# Patient Record
Sex: FEMALE | Race: OTHER | Employment: FULL TIME | URBAN - METROPOLITAN AREA
[De-identification: names, ages, dates, MRNs, and addresses within clinical notes are randomized per-mention and may not be internally consistent; named-entity substitution may affect disease eponyms.]

---

## 2019-10-23 ENCOUNTER — OFFICE VISIT (OUTPATIENT)
Dept: NEUROLOGY | Age: 39
End: 2019-10-23

## 2019-10-23 VITALS
WEIGHT: 127.2 LBS | BODY MASS INDEX: 23.41 KG/M2 | HEART RATE: 94 BPM | HEIGHT: 62 IN | RESPIRATION RATE: 14 BRPM | SYSTOLIC BLOOD PRESSURE: 104 MMHG | DIASTOLIC BLOOD PRESSURE: 74 MMHG | TEMPERATURE: 97.9 F | OXYGEN SATURATION: 99 %

## 2019-10-23 DIAGNOSIS — F07.81 POST CONCUSSION SYNDROME: Primary | ICD-10-CM

## 2019-10-23 DIAGNOSIS — M54.2 CERVICALGIA: ICD-10-CM

## 2019-10-23 DIAGNOSIS — G44.311 INTRACTABLE ACUTE POST-TRAUMATIC HEADACHE: ICD-10-CM

## 2019-10-23 DIAGNOSIS — G31.84 MCI (MILD COGNITIVE IMPAIRMENT): ICD-10-CM

## 2019-10-23 DIAGNOSIS — F41.1 GAD (GENERALIZED ANXIETY DISORDER): ICD-10-CM

## 2019-10-23 RX ORDER — NORTRIPTYLINE HYDROCHLORIDE 10 MG/1
10 CAPSULE ORAL
Qty: 30 CAP | Refills: 2 | Status: SHIPPED | OUTPATIENT
Start: 2019-10-23 | End: 2019-12-27 | Stop reason: DRUGHIGH

## 2019-10-23 RX ORDER — CYCLOBENZAPRINE HCL 10 MG
TABLET ORAL
Refills: 3 | COMMUNITY
Start: 2019-10-01

## 2019-10-23 RX ORDER — NAPROXEN 500 MG/1
TABLET ORAL
Refills: 3 | COMMUNITY
Start: 2019-10-01

## 2019-10-23 RX ORDER — PREDNISONE 10 MG/1
10 TABLET ORAL 2 TIMES DAILY
Qty: 30 TAB | Refills: 0 | Status: SHIPPED | OUTPATIENT
Start: 2019-10-23

## 2019-10-23 RX ORDER — DICLOFENAC SODIUM 100 MG/1
100 TABLET, FILM COATED, EXTENDED RELEASE ORAL DAILY
Qty: 30 TAB | Refills: 3 | Status: SHIPPED | OUTPATIENT
Start: 2019-10-23 | End: 2020-02-25 | Stop reason: SDUPTHER

## 2019-10-23 NOTE — PATIENT INSTRUCTIONS
All test results will be discussed at the next appointment. CT Scan of the Head: About This Test  What is it? A CT (computed tomography) scan uses X-rays to make detailed pictures of your body and the structures inside your body. A CT scan of the head can give your doctor information about your eyes, the bones of your face and nose, your inner ear, and your brain. During the test, you will lie on a table that is attached to the [employer]. The CT scanner is a large doughnut-shaped machine. Why is this test done? A CT scan of the head can help find the cause of symptoms that may mean you have a brain injury or bleeding inside your head. It can also find a tumor and damage caused by a stroke and help find the best treatment for the cause of a stroke. How can you prepare for the test?  Talk to your doctor about all your health conditions before the test. For example, tell your doctor if:  · You are or might be pregnant. · You are allergic to any medicines. · You have diabetes. · You take metformin. · You are breastfeeding. · You get nervous in confined spaces. You may need medicine to help you relax. What happens before the test?  · You may have to take off jewelry, glasses, or hearing aids. Wear comfortable, loose-fitting clothes. · You may have contrast material (dye) put into your arm through a tube called an IV. Contrast material helps doctors see specific organs, blood vessels, and most tumors. What happens during the test?  · You will lie on a table that is attached to the CT scanner. Straps will hold your head still but your face will not be covered. · The table will slide into the round opening of the scanner. The table will move during the scan. The scanner moves inside the doughnut-shaped casing around your body. · You will be asked to hold still during the scan. You may be asked to hold your breath for short periods.   · You may be alone in the scanning room, but a technologist will be watching you through a window and talking with you during the test.  What else should you know about the test?  · A CT scan does not hurt. · If a dye is used, you may feel a quick sting or pinch when the IV is started. The dye may make you feel warm and flushed and give you a metallic taste in your mouth. Some people feel sick to their stomach or get a headache. · If you breastfeed and are concerned about whether the dye used in this test is safe, talk to your doctor. Most experts believe that very little dye passes into breast milk and even less is passed on to the baby. But if you prefer, you can store some of your breast milk ahead of time and use it for a day or two after the test.  · There is a small chance of getting cancer from some types of CT scans. The risk is higher in children, young adults, and people who have many radiation tests. If you are concerned about this risk, talk to your doctor about the benefits and risks of a CT scan and confirm that the test is needed. How long does the test take? · The test will take about 30 to 60 minutes. Most of this time is spent getting ready for the scan. The actual test only takes a few minutes. What happens after the test?  · You will probably be able to go home right away. · You can go back to your usual activities right away. · Drink plenty of fluids for 24 hours after the test if dye was used, unless your doctor tells you not to. When should you call for help? Watch closely for changes in your health, and be sure to contact your doctor if you have any problems. Follow-up care is a key part of your treatment and safety. Be sure to make and go to all appointments, and call your doctor if you are having problems. It's also a good idea to keep a list of the medicines you take. Ask your doctor when you can expect to have your test results. Where can you learn more? Go to http://marquise-bowen.info/.   Enter L947 in the search box to learn more about \"CT Scan of the Head: About This Test.\"  Current as of: March 28, 2019  Content Version: 12.2  © 6205-3086 [employer]. Care instructions adapted under license by [employer] (which disclaims liability or warranty for this information). If you have questions about a medical condition or this instruction, always ask your healthcare professional. [address] any warranty or liability for your use of this information.

## 2019-10-26 ENCOUNTER — HOSPITAL ENCOUNTER (OUTPATIENT)
Dept: CT IMAGING | Age: 39
Discharge: HOME | End: 2019-10-26
Attending: PSYCHIATRY & NEUROLOGY
Payer: SELF-PAY

## 2019-10-26 DIAGNOSIS — F41.1 GAD (GENERALIZED ANXIETY DISORDER): ICD-10-CM

## 2019-10-26 DIAGNOSIS — F07.81 POST CONCUSSION SYNDROME: ICD-10-CM

## 2019-10-26 PROCEDURE — 70450 CT HEAD/BRAIN W/O DYE: CPT

## 2019-12-27 ENCOUNTER — OFFICE VISIT (OUTPATIENT)
Dept: NEUROLOGY | Age: 39
End: 2019-12-27

## 2019-12-27 VITALS
HEART RATE: 101 BPM | WEIGHT: 128 LBS | OXYGEN SATURATION: 98 % | DIASTOLIC BLOOD PRESSURE: 60 MMHG | BODY MASS INDEX: 23.41 KG/M2 | SYSTOLIC BLOOD PRESSURE: 116 MMHG

## 2019-12-27 DIAGNOSIS — M54.2 CERVICALGIA: ICD-10-CM

## 2019-12-27 DIAGNOSIS — F07.81 POSTCONCUSSION SYNDROME: Primary | ICD-10-CM
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
## 2019-12-27 DIAGNOSIS — R42 DIZZINESS: ICD-10-CM

## 2019-12-27 DIAGNOSIS — G31.84 MCI (MILD COGNITIVE IMPAIRMENT): ICD-10-CM

## 2019-12-27 DIAGNOSIS — G44.311 INTRACTABLE ACUTE POST-TRAUMATIC HEADACHE: ICD-10-CM

## 2019-12-27 RX ORDER — NORTRIPTYLINE HYDROCHLORIDE 25 MG/1
25 CAPSULE ORAL
Qty: 30 CAP | Refills: 1 | Status: SHIPPED | OUTPATIENT
Start: 2019-12-27 | End: 2020-02-25 | Stop reason: DRUGHIGH

## 2019-12-27 RX ORDER — IBUPROFEN 800 MG/1
800 TABLET ORAL
Qty: 30 TAB | Refills: 1 | Status: SHIPPED | OUTPATIENT
Start: 2019-12-27
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
## 2019-12-30 ENCOUNTER — HOSPITAL ENCOUNTER (OUTPATIENT)
Dept: NEUROLOGY | Age: 39
Discharge: HOME | End: 2019-12-30
Attending: PSYCHIATRY & NEUROLOGY
Payer: COMMERCIAL

## 2019-12-30 DIAGNOSIS — R42 DIZZINESS: ICD-10-CM

## 2019-12-30 DIAGNOSIS — F07.81 POSTCONCUSSION SYNDROME: ICD-10-CM

## 2019-12-30 PROCEDURE — 95816 EEG AWAKE AND DROWSY: CPT

## 2019-12-31 NOTE — PROCEDURES
[facility] [place] [facility]  EEG    Name:  Yoan Mckeon  MR#:  [id number]  :  1980  ACCOUNT #:  [de-identified]  DATE OF SERVICE:  2019      REQUESTING PROVIDER:  Magdaleno Baker MD    CLINICAL HISTORY:  An EEG is requested on this 14-year-old woman to evaluate for epileptiform abnormalities. MEDICATIONS:  Listed as Flexeril, Voltaren, Pamelor, Naprosyn. EEG REPORT:  This tracing is obtained during the awake state. During wakefulness, there are intermittent runs of posteriorly dominant and symmetrical low-to-medium amplitude 9-10 cycle per second activities which attenuate with eye opening. Lower voltage faster frequency activities are seen symmetrically over the anterior head regions. Hyperventilation was performed for 30 minutes with good effort and little alters the tracing. Intermittent photic stimulation little alters the tracing. Sleep is not attained. INTERPRETATION:  This EEG recorded during the awake state is normal.  No epileptiform abnormalities are seen.       Marena Cogan, MD      [id number]  D:  2019 7:15  T:  2019 12:50  JOB #:  [id number]

## 2020-01-23 ENCOUNTER — APPOINTMENT (OUTPATIENT)
Dept: PHYSICAL THERAPY | Age: 40
End: 2020-01-23

## 2020-02-07 ENCOUNTER — APPOINTMENT (OUTPATIENT)
Dept: PHYSICAL THERAPY | Age: 40
End: 2020-02-07

## 2020-02-10 ENCOUNTER — HOSPITAL ENCOUNTER (OUTPATIENT)
Dept: PHYSICAL THERAPY | Age: 40
Discharge: HOME | End: 2020-02-10
Payer: SELF-PAY

## 2020-02-10 PROCEDURE — 97162 PT EVAL MOD COMPLEX 30 MIN: CPT | Performed by: PHYSICAL THERAPIST

## 2020-02-10 PROCEDURE — 97112 NEUROMUSCULAR REEDUCATION: CPT | Performed by: PHYSICAL THERAPIST
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
## 2020-02-20 ENCOUNTER — HOSPITAL ENCOUNTER (OUTPATIENT)
Dept: PHYSICAL THERAPY | Age: 40
Discharge: HOME | End: 2020-02-20
Payer: SELF-PAY

## 2020-02-20 PROCEDURE — 97112 NEUROMUSCULAR REEDUCATION: CPT | Performed by: PHYSICAL MEDICINE & REHABILITATION

## 2020-02-20 PROCEDURE — 97110 THERAPEUTIC EXERCISES: CPT | Performed by: PHYSICAL MEDICINE & REHABILITATION

## 2020-02-20 NOTE — PROGRESS NOTES
PT DAILY TREATMENT NOTE - [facility] 2-15    Patient Name: Char Lincoln  Date:2020  : 1980  [x]  Patient  Verified  Payor: SELF PAY / Plan: [facility] SELF PAY / Product Type: Self Pay /    In time:900a  Out time:1000a  Total Treatment Time (min): 60  Total Timed Codes (min): 45  1:1 Treatment Time ( only): -   Visit #: 2      Treatment Area: Dizziness and giddiness [R42]    SUBJECTIVE  Pain Level (0-10 scale): \"slight HA\"  Any medication changes, allergies to medications, adverse drug reactions, diagnosis change, or new procedure performed?: [x] No    [] Yes (see summary sheet for update)  Subjective functional status/changes:   [] No changes reported  Patient reported that she starting doing her HEP a few days ago. Patient states that she continues to improve with driving but still has her instances of anxiety/panic. \"Last week was really hard. I had a 10/10 HA and was crying because of the pain. \"    OBJECTIVE    Modality rationale: decrease inflammation, decrease pain and increase tissue extensibility to improve the patients ability to ADLs, working tolerance   Min Type Additional Details    [] Estim: []Att   []Unatt        []TENS instruct                  []IFC  []Premod   []NMES                     []Other:  []w/US   []w/ice   []w/heat  Position:  Location:    []  Traction: [] Cervical       []Lumbar                       [] Prone          []Supine                       []Intermittent   []Continuous Lbs:  [] before manual  [] after manual  []w/heat    []  Ultrasound: []Continuous   [] Pulsed at:                           []1MHz   []3MHz Location:  W/cm2:    [] Paraffin         Location:   []w/heat   15 [x]  Ice     [x]  Heat  []  Ice massage Position: supine  Location: head and neck    []  Laser  []  Other: Position:  Location:      []  Vasopneumatic Device Pressure:       [] lo [] med [] hi   Temperature:      [x] Skin assessment post-treatment:  [x]intact []redness- no adverse reaction    []redness  adverse reaction:     15 min Therapeutic Exercise:  [x] See flow sheet :   Rationale: increase ROM, increase strength and improve coordination to improve the patients ability to ADLs, work tolerance     30 min Neuromuscular Re-education:  [x]  See flow sheet :   Rationale: improve coordination, improve balance and increase proprioception  to improve the patients ability to ADLs, work tolerance          With   [x] TE   [] TA   [] neuro   [] other: Patient Education: [x] Review HEP    [] Progressed/Changed HEP based on:   [] positioning   [] body mechanics   [] transfers   [] heat/ice application    [] other:      Other Objective/Functional Measures:   Great balance tolerance today. Slight increase in HA with 7 minutes on bike. Slight dizziness with vertical VORx1 and difficulty focusing eyes on target with horizontal VORx1     Pain Level (0-10 scale) post treatment: 0/10    ASSESSMENT/Changes in Function:     Patient will continue to benefit from skilled PT services to modify and progress therapeutic interventions, address functional mobility deficits, address ROM deficits, address strength deficits, analyze and address soft tissue restrictions, analyze and cue movement patterns, analyze and modify body mechanics/ergonomics, assess and modify postural abnormalities and address imbalance/dizziness to attain remaining goals. []  See Plan of Care  []  See progress note/recertification  []  See Discharge Summary         Progress towards goals / Updated goals:  Patient tolerated today's session very well and should continue to progress.      PLAN  [x]  Upgrade activities as tolerated     [x]  Continue plan of care  [x]  Update interventions per flow sheet       []  Discharge due to:_  []  Other:_      Teodora Speedy PTA, OPTA, CPT  2/20/2020

## 2020-02-24 ENCOUNTER — HOSPITAL ENCOUNTER (OUTPATIENT)
Dept: PHYSICAL THERAPY | Age: 40
Discharge: HOME | End: 2020-02-24
Payer: SELF-PAY

## 2020-02-24 PROCEDURE — 97112 NEUROMUSCULAR REEDUCATION: CPT | Performed by: PHYSICAL MEDICINE & REHABILITATION

## 2020-02-24 PROCEDURE — 97110 THERAPEUTIC EXERCISES: CPT | Performed by: PHYSICAL MEDICINE & REHABILITATION

## 2020-02-24 NOTE — PROGRESS NOTES
PT DAILY TREATMENT NOTE - [facility] -15    Patient Name: Edilson Rivers  Date:2020  : 1980  [x]  Patient  Verified  Payor: SELF PAY [facility] / Product Type: Self Pay /    In time:1110 am  Out time:1210p  Total Treatment Time (min): 60  Total Timed Codes (min): 45  1:1 Treatment Time ( only): -   Visit #: 3      Treatment Area: Dizziness and giddiness [R42]    SUBJECTIVE  Pain Level (0-10 scale): 7/10  Any medication changes, allergies to medications, adverse drug reactions, diagnosis change, or new procedure performed?: [x] No    [] Yes (see summary sheet for update)  Subjective functional status/changes:   [] No changes reported  Patient reported that she has been doing her HEP 1 x a day right now and some are still challenging. Patient stated her HA have been better since last visit.     OBJECTIVE    Modality rationale: decrease inflammation, decrease pain and increase tissue extensibility to improve the patients ability to ADLs, working tolerance   Min Type Additional Details    [] Estim: []Att   []Unatt        []TENS instruct                  []IFC  []Premod   []NMES                     []Other:  []w/US   []w/ice   []w/heat  Position:  Location:    []  Traction: [] Cervical       []Lumbar                       [] Prone          []Supine                       []Intermittent   []Continuous Lbs:  [] before manual  [] after manual  []w/heat    []  Ultrasound: []Continuous   [] Pulsed at:                           []1MHz   []3MHz Location:  W/cm2:    [] Paraffin         Location:   []w/heat   15 []  Ice     [x]  Heat  []  Ice massage Position: supine  Location: head and neck    []  Laser  []  Other: Position:  Location:      []  Vasopneumatic Device Pressure:       [] lo [] med [] hi   Temperature:      [x] Skin assessment post-treatment:  [x]intact []redness- no adverse reaction    []redness  adverse reaction:     15 min Therapeutic Exercise:  [x] See flow sheet :   Rationale: increase ROM, increase strength and improve coordination to improve the patients ability to ADLs, work tolerance     30 min Neuromuscular Re-education:  [x]  See flow sheet :   Rationale: improve coordination, improve balance and increase proprioception  to improve the patients ability to ADLs, work tolerance          With   [x] TE   [] TA   [] neuro   [] other: Patient Education: [x] Review HEP    [] Progressed/Changed HEP based on:   [] positioning   [] body mechanics   [] transfers   [] heat/ice application    [] other:      Other Objective/Functional Measures:   Significant improvement in EC balance today. Slight increase in HA with 8 minutes on bike. Slight increase in HA with vertical VORx1 and able to focus on tartget with increase in HA/dizziness with horizontal VORx1   All symptoms reduced within 1 minute. Pain Level (0-10 scale) post treatment: 0/10    ASSESSMENT/Changes in Function:     Patient will continue to benefit from skilled PT services to modify and progress therapeutic interventions, address functional mobility deficits, address ROM deficits, address strength deficits, analyze and address soft tissue restrictions, analyze and cue movement patterns, analyze and modify body mechanics/ergonomics, assess and modify postural abnormalities and address imbalance/dizziness to attain remaining goals. []  See Plan of Care  []  See progress note/recertification  []  See Discharge Summary         Progress towards goals / Updated goals:  Patient tolerated today's session very well and should continue to progress.      PLAN  [x]  Upgrade activities as tolerated     [x]  Continue plan of care  [x]  Update interventions per flow sheet       []  Discharge due to:_  []  Other:_      Amalia Sorenson PTA, OPTA, CPT  2/24/2020

## 2020-02-25 ENCOUNTER — OFFICE VISIT (OUTPATIENT)
Dept: NEUROLOGY | Age: 40
End: 2020-02-25

## 2020-02-25 VITALS
WEIGHT: 130.4 LBS | BODY MASS INDEX: 24 KG/M2 | OXYGEN SATURATION: 98 % | DIASTOLIC BLOOD PRESSURE: 70 MMHG | SYSTOLIC BLOOD PRESSURE: 98 MMHG | RESPIRATION RATE: 18 BRPM | TEMPERATURE: 98.5 F | HEART RATE: 77 BPM | HEIGHT: 62 IN

## 2020-02-25 DIAGNOSIS — F07.81 POSTCONCUSSION SYNDROME: Primary | ICD-10-CM

## 2020-02-25 DIAGNOSIS — R42 DIZZINESS: ICD-10-CM
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
## 2020-02-25 DIAGNOSIS — G31.84 MCI (MILD COGNITIVE IMPAIRMENT): ICD-10-CM

## 2020-02-25 DIAGNOSIS — G44.311 INTRACTABLE ACUTE POST-TRAUMATIC HEADACHE: ICD-10-CM

## 2020-02-25 DIAGNOSIS — M54.2 CERVICALGIA: ICD-10-CM

## 2020-02-25 DIAGNOSIS — F41.1 GAD (GENERALIZED ANXIETY DISORDER): ICD-10-CM

## 2020-02-25 DIAGNOSIS — M79.18 MYOFASCIAL MUSCLE PAIN: ICD-10-CM

## 2020-02-25 RX ORDER — DICLOFENAC SODIUM 100 MG/1
100 TABLET, FILM COATED, EXTENDED RELEASE ORAL DAILY
Qty: 30 TAB | Refills: 3 | Status: SHIPPED | OUTPATIENT
Start: 2020-02-25

## 2020-02-25 RX ORDER — NORTRIPTYLINE HYDROCHLORIDE 10 MG/1
10 CAPSULE ORAL
Qty: 30 CAP | Refills: 2 | Status: SHIPPED | OUTPATIENT
Start: 2020-02-25
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
## 2020-03-03 ENCOUNTER — HOSPITAL ENCOUNTER (OUTPATIENT)
Dept: PHYSICAL THERAPY | Age: 40
Discharge: HOME | End: 2020-03-03
Payer: SELF-PAY

## 2020-03-03 PROCEDURE — 97112 NEUROMUSCULAR REEDUCATION: CPT | Performed by: PHYSICAL THERAPIST

## 2020-03-03 PROCEDURE — 97110 THERAPEUTIC EXERCISES: CPT | Performed by: PHYSICAL THERAPIST

## 2020-03-03 NOTE — PROGRESS NOTES
PT DAILY TREATMENT NOTE - [facility] 2-15    Patient Name: Rogelio Hence  Date:3/3/2020  : 1980  [x]  Patient  Verified  Payor: SELF PAY / Plan: [facility] SELF PAY / Product Type: Self Pay /    In time:11:50 am  Out time: 12:50 PM  Total Treatment Time (min): 60    Visit #: 4      Treatment Area: Dizziness and giddiness [R42]    SUBJECTIVE  Pain Level (0-10 scale): 5/10  Any medication changes, allergies to medications, adverse drug reactions, diagnosis change, or new procedure performed?: [x] No    [] Yes (see summary sheet for update)  Subjective functional status/changes:   [] No changes reported  Patient reports she feels like she has progressed in a lot of areas but she still has headaches every day. Pt reports she is trying to sleep at night. Pt saw her MD last week.   EEG negative  Pt started taking Diclofenac   OBJECTIVE    Modality rationale: decrease inflammation, decrease pain and increase tissue extensibility to improve the patients ability to ADLs, working tolerance   Min Type Additional Details    [] Estim: []Att   []Unatt        []TENS instruct                  []IFC  []Premod   []NMES                     []Other:  []w/US   []w/ice   []w/heat  Position:  Location:    []  Traction: [] Cervical       []Lumbar                       [] Prone          []Supine                       []Intermittent   []Continuous Lbs:  [] before manual  [] after manual  []w/heat    []  Ultrasound: []Continuous   [] Pulsed at:                           []1MHz   []3MHz Location:  W/cm2:    [] Paraffin         Location:   []w/heat   10 []  Ice     [x]  Heat  []  Ice massage Position: supine  Location: head and neck    []  Laser  []  Other: Position:  Location:      []  Vasopneumatic Device Pressure:       [] lo [] med [] hi   Temperature:      [x] Skin assessment post-treatment:  [x]intact []redness- no adverse reaction    []redness  adverse reaction:     10 min Therapeutic Exercise:  [x] See flow sheet :   Rationale: increase ROM, increase strength and improve coordination to improve the patients ability to ADLs, work tolerance     35 min Neuromuscular Re-education:  [x]  See flow sheet :   Rationale: improve coordination, improve balance and increase proprioception  to improve the patients ability to ADLs, work tolerance          With   [x] TE   [] TA   [] neuro   [] other: Patient Education: [x] Review HEP    [] Progressed/Changed HEP based on:   [] positioning   [] body mechanics   [] transfers   [] heat/ice application    [] other:      Other Objective/Functional Measures:   VOR x1 for one minute in sitting, recovery in 1 minute    SLS R 27 L 25    Pain Level (0-10 scale) post treatment: 0/10    ASSESSMENT/Changes in Function:     Patient will continue to benefit from skilled PT services to modify and progress therapeutic interventions, address functional mobility deficits, address ROM deficits, address strength deficits, analyze and address soft tissue restrictions, analyze and cue movement patterns, analyze and modify body mechanics/ergonomics, assess and modify postural abnormalities and address imbalance/dizziness to attain remaining goals.      []  See Plan of Care  []  See progress note/recertification  []  See Discharge Summary         Progress towards goals / Updated goals:  Advanced vest exercises, reviewed importance of eye protection in sunlight and minimizing screen time    PLAN  [x]  Upgrade activities as tolerated     [x]  Continue plan of care  [x]  Update interventions per flow sheet       []  Discharge due to:_  []  Other:_      Timbo Echeverria, PT  DPT, [facility] 3/3/2020

## 2020-03-31 ENCOUNTER — HOSPITAL ENCOUNTER (OUTPATIENT)
Dept: PHYSICAL THERAPY | Age: 40
Discharge: HOME | End: 2020-03-31
Payer: SELF-PAY

## 2020-03-31 PROCEDURE — 97112 NEUROMUSCULAR REEDUCATION: CPT | Performed by: PHYSICAL THERAPIST

## 2020-03-31 PROCEDURE — 97110 THERAPEUTIC EXERCISES: CPT | Performed by: PHYSICAL THERAPIST

## 2020-03-31 NOTE — PROGRESS NOTES
[facility]  [address] [facility],  [address]  Phone: [phone]      Fax:  [phone]    Progress Note    Name: Yoel Solitario   : 1980   MD: Mitzy Steiner MD       Treatment Diagnosis: Dizziness and giddiness [R42]  Start of Care: 2/10/20    Visits from Start of Care: 5  Missed Visits: 0    Summary of Care: Therapeutic Exercise,  Manual Therapy, Neuromuscular Re-education,Patient Education, Home Exercise Program         Assessment / Recommendations: The patient has completed 5 visits and has made significant gains in static balance and demonstrates improved activity tolerance. The patient reports improved driving tolerance and performance of work tasks. Her symptoms are less frequent and less intense. The patient continues to be limited by intermittent neck pain, headaches, dizziness and nausea, affecting her ability to perform prolonged tasks. The patient has met all short term goals and would benefit from continued PT to reach long term goals. Short Term Goals: To be accomplished in 4 weeks:  1) The patient will be independent with introductory HEP - MET  2) The patient will improve cervical rotation to 50 deg B to improve ease with driving - Not Measured  3) The patient will demonstrate negative Romberg to indicate improved static stance - MET  Long Term Goals: To be accomplished in 12 weeks:  1) The patient will report ability to complete household chores without an increase in symptoms - NOT MET  2) The patient will report ability to complete work tasks without an increase in symptoms- NOT MET  3) The patient will report ability to drive for one hour without an increase in symptoms -  NOT MET  Fabiana Cherry, PT 3/31/2020    ________________________________________________________________________  NOTE TO PHYSICIAN:  Please complete the following and fax to:   [facility]: [phone]  . Retain this original for your records. If you are unable to process this request in 24 hours, please contact our office.        ____ I have read the above report and request that my patient continue therapy with the following changes/special instructions:  ____ I have read the above report and request that my patient be discharged from therapy    Physician's Signature:_________________ Date:___________Time:__________

## 2020-03-31 NOTE — PROGRESS NOTES
PT DAILY TREATMENT NOTE - [facility] 2-15    Patient Name: Minda Richter  Date:3/31/2020  : 1980  [x]  Patient  Verified  Payor: SELF PAY / Plan: [facility] SELF PAY / Product Type: Self Pay /    In time:10:50 AM  Out time: 11:55 AM  Total Treatment Time (min): 65  Visit #: 5    Treatment Area: Dizziness and giddiness [R42]    SUBJECTIVE  Pain Level (0-10 scale): 5/10  Any medication changes, allergies to medications, adverse drug reactions, diagnosis change, or new procedure performed?: [x] No    [] Yes (see summary sheet for update)  Subjective functional status/changes:   [] No changes reported  Patient reports she has been feeling better overall. Pt is still taking the Diclofenac. Pt reports she still has headaches but they aren't as bad. Pt had an episode of intense concussion symptoms when she approached a bunch of police cars on the highway at night. \"Still my sleep is not good. \"  Pt was sick last week and couldn't come to PT last week. Pt reports her panic attacks are better when she is driving so she is no longer seeing a counselor.     OBJECTIVE    Modality rationale: decrease inflammation, decrease pain and increase tissue extensibility to improve the patients ability to ADLs, working tolerance   Min Type Additional Details    [] Estim: []Att   []Unatt        []TENS instruct                  []IFC  []Premod   []NMES                     []Other:  []w/US   []w/ice   []w/heat  Position:  Location:    []  Traction: [] Cervical       []Lumbar                       [] Prone          []Supine                       []Intermittent   []Continuous Lbs:  [] before manual  [] after manual  []w/heat    []  Ultrasound: []Continuous   [] Pulsed at:                           []1MHz   []3MHz Location:  W/cm2:    [] Paraffin         Location:   []w/heat   5 []  Ice     [x]  Heat  []  Ice massage Position: supine  Location: head and neck    []  Laser  []  Other: Position:  Location:      []  Vasopneumatic Device Pressure:       [] lo [] med [] hi   Temperature:      [x] Skin assessment post-treatment:  [x]intact []redness- no adverse reaction    []redness  adverse reaction:     10 min Therapeutic Exercise:  [x] See flow sheet :   Rationale: increase ROM, increase strength and improve coordination to improve the patients ability to ADLs, work tolerance     50 min Neuromuscular Re-education:  [x]  See flow sheet :   Rationale: improve coordination, improve balance and increase proprioception  to improve the patients ability to ADLs, work tolerance          With   [x] TE   [] TA   [] neuro   [] other: Patient Education: [x] Review HEP    [] Progressed/Changed HEP based on:   [] positioning   [] body mechanics   [] transfers   [] heat/ice application    [] other:      Other Objective/Functional Measures:   VOR x1 for one minute in sitting, recovery in 1 minute    SLS 30 s B  EC balance Heel to bunion 30 s No LOB    Encouraged to purchase blue light blockers    Pain Level (0-10 scale) post treatment: 0/10    ASSESSMENT/Changes in Function:     Patient will continue to benefit from skilled PT services to modify and progress therapeutic interventions, address functional mobility deficits, address ROM deficits, address strength deficits, analyze and address soft tissue restrictions, analyze and cue movement patterns, analyze and modify body mechanics/ergonomics, assess and modify postural abnormalities and address imbalance/dizziness to attain remaining goals. []  See Plan of Care  []  See progress note/recertification  []  See Discharge Summary         Progress towards goals / Updated goals:  Pt given updated HEP as she will be unable to return due to COVID-19.     PLAN  [x]  Upgrade activities as tolerated     [x]  Continue plan of care  [x]  Update interventions per flow sheet       []  Discharge due to:_  []  Other:_      Shahid Pompa, PT  DPT, [facility] 3/31/2020

## 2020-04-15 ENCOUNTER — HOSPITAL ENCOUNTER (OUTPATIENT)
Dept: PHYSICAL THERAPY | Age: 40
Discharge: HOME | End: 2020-04-15
Payer: SELF-PAY

## 2020-04-15 PROCEDURE — 97110 THERAPEUTIC EXERCISES: CPT | Performed by: PHYSICAL THERAPIST

## 2020-04-15 PROCEDURE — 97112 NEUROMUSCULAR REEDUCATION: CPT | Performed by: PHYSICAL THERAPIST

## 2020-04-15 NOTE — PROGRESS NOTES
PT DAILY TREATMENT NOTE 2-15    Patient Name: Lee Nuñez  Date:4/15/2020  : 1980  [x]  Patient  Verified  Payor: SELF PAY / Plan: [facility] SELF PAY / Product Type: Self Pay /     In time:7:05 AM  Out time:7:45 AM  Total Treatment Time (min): 40  Visit #:  6    Treatment Area: Dizziness and giddiness [R42]    Lee Nuñez was informed of the inherent limitations of a virtual visit,  and has consented to a virtual therapy visit on 4/15/2020. Information regarding emergency contact information for this patient during this visit is to contact:  Darylene Mins at [phone] in addition to calling 911. The patient was informed that at any time during the virtual visit, they can decide to stop the virtual visit. The patient verified that they are physically located in the [facility] for this virtual visit. SUBJECTIVE  Pain Level (0-10 scale): 4/10  Any medication changes, allergies to medications, adverse drug reactions, diagnosis change, or new procedure performed?: [x] No    [] Yes (see summary sheet for update)  Subjective functional status/changes:   [] No changes reported  \"I'm ok, my headaches are not that bad. \"  Pt is still working and still has to go out.    \"sometimes I get flashbacks when I'm driving, especially when I have to somewhere new\"    OBJECTIVE    10 min Therapeutic Exercise:  [x] See flow sheet :   Rationale: increase ROM, increase strength and improve coordination to improve the patients ability to sit, stand, transfer, ambulate, lift, carry, reach, complete ADLs    30 min Neuromuscular Re-education:  [x]  See flow sheet :   Rationale: improve coordination, improve balance and increase proprioception  to improve the patients ability to sit, stand, transfer, ambulate, lift, carry, reach, complete ADLs       With   [x] TE   [] TA   [x] neuro   [] other: Patient Education: [x] Review HEP    [] Progressed/Changed HEP based on:   [x] positioning   [x] body mechanics   [] transfers [x] heat/ice application    [] other:      Other Objective/Functional Measures:     Heel to GT EC 30 second hold no LOB      Dizziness with standing VOR x 1 resolved within 2 min sitting rest break    Access Code: [email or web address]   URL: [email or web address]   Date: 04/15/2020   Prepared by: Gabriel Fuentes     Exercises   Single Arm Doorway Pec Stretch at 120 Degrees Abduction - 2 sets - 30 hold - 2x daily - 7x weekly  \"this one helps with my nausea\"    Pain Level (0-10 scale) post treatment: 0    ASSESSMENT/Changes in Function:     Patient will continue to benefit from skilled PT services to modify and progress therapeutic interventions, address functional mobility deficits, address ROM deficits, address strength deficits, analyze and address soft tissue restrictions, analyze and cue movement patterns, analyze and modify body mechanics/ergonomics, assess and modify postural abnormalities, address imbalance/dizziness and instruct in home and community integration to attain remaining goals. []  See Plan of Care  []  See progress note/recertification  []  See Discharge Summary         Progress towards goals / Updated goals:   Balance EC improved. Reviewed importance of compliance with HEP. PLAN  [x]  Upgrade activities as tolerated     [x]  Continue plan of care  [x]  Update interventions per flow sheet       []  Discharge due to:_  []  Other:_        James Godinez is a 38 y.o. female being evaluated by a Virtual Visit (video visit) encounter to address concerns as mentioned above. A caregiver was present when appropriate. Due to this being a TeleHealth encounter (During [place] [address] public health emergency), evaluation of the following areas was limited: Direct tissue palpation, direct goniometric measurements, blood pressure, O2 saturation.  Pursuant to the emergency declaration under the [facility] waiver authority and the [place] [employer] and [employer] Supplemental Appropriations Act, this Virtual Visit was conducted with patient's (and/or legal guardian's) consent, to reduce the risk of exposure to COVID-19 and provide necessary medical care. Services were provided through a video synchronous discussion virtually to substitute for in-person encounter. --Vee Martinez, PT on 4/15/2020 at 7:00 AM    An electronic signature was used to authenticate this note.

## 2020-04-22 ENCOUNTER — HOSPITAL ENCOUNTER (OUTPATIENT)
Dept: PHYSICAL THERAPY | Age: 40
Discharge: HOME | End: 2020-04-22
Payer: SELF-PAY

## 2020-04-22 PROCEDURE — 97112 NEUROMUSCULAR REEDUCATION: CPT | Performed by: PHYSICAL THERAPIST

## 2020-04-22 PROCEDURE — 97110 THERAPEUTIC EXERCISES: CPT | Performed by: PHYSICAL THERAPIST

## 2020-04-22 NOTE — PROGRESS NOTES
PT DAILY TREATMENT NOTE 2-15    Patient Name: Afsaneh Hubbard  Date:2020  : 1980  [x]  Patient  Verified  Payor: SELF PAY / Plan: [facility] SELF PAY / Product Type: Self Pay /     In time:7:05 AM  Out time:7:45 AM  Total Treatment Time (min): 40  Visit #:  7    Treatment Area: Dizziness and giddiness [R42]    Afsaneh Hubbard was informed of the inherent limitations of a virtual visit,  and has consented to a virtual therapy visit on 2020. Information regarding emergency contact information for this patient during this visit is to contact:  Fidel Valle at [phone] in addition to calling 911. The patient was informed that at any time during the virtual visit, they can decide to stop the virtual visit. The patient verified that they are physically located in the [facility] for this virtual visit.     SUBJECTIVE  Pain Level (0-10 scale): 4/10  Any medication changes, allergies to medications, adverse drug reactions, diagnosis change, or new procedure performed?: [x] No    [] Yes (see summary sheet for update)  Subjective functional status/changes:   [] No changes reported  Pt reports she is working a lot and is very tired   Pt reports compliance with HEP \"my exercises are helping me\" \"Overall I'm less stressed and irritable\"  \"There were days that I was confused\"  \"its worse when its raining\" \"its worse when I'm driving\"    OBJECTIVE    10 min Therapeutic Exercise:  [x] See flow sheet :   Rationale: increase ROM, increase strength and improve coordination to improve the patients ability to sit, stand, transfer, ambulate, lift, carry, reach, complete ADLs    30 min Neuromuscular Re-education:  [x]  See flow sheet :   Rationale: improve coordination, improve balance and increase proprioception  to improve the patients ability to sit, stand, transfer, ambulate, lift, carry, reach, complete ADLs       With   [x] TE   [] TA   [x] neuro   [] other: Patient Education: [x] Review HEP    [] Progressed/Changed HEP based on:   [x] positioning   [x] body mechanics   [] transfers   [x] heat/ice application    [] other:      Other Objective/Functional Measures:   SLS EO 30 s  B  Heel to GT EC 30 second hold no LOB  HTT 10 s EC    HA with standing VOR x 1 facing pattern resolved within 2 min sitting rest break    Pain Level (0-10 scale) post treatment: 0    ASSESSMENT/Changes in Function:     Patient will continue to benefit from skilled PT services to modify and progress therapeutic interventions, address functional mobility deficits, address ROM deficits, address strength deficits, analyze and address soft tissue restrictions, analyze and cue movement patterns, analyze and modify body mechanics/ergonomics, assess and modify postural abnormalities, address imbalance/dizziness and instruct in home and community integration to attain remaining goals. []  See Plan of Care  []  See progress note/recertification  []  See Discharge Summary         Progress towards goals / Updated goals:   Advanced VOR x 1    PLAN  [x]  Upgrade activities as tolerated     [x]  Continue plan of care  [x]  Update interventions per flow sheet       []  Discharge due to:_  []  Other:_        Marciano Cardoso is a 38 y.o. female being evaluated by a Virtual Visit (video visit) encounter to address concerns as mentioned above. A caregiver was present when appropriate. Due to this being a TeleHealth encounter (During [facility] public health emergency), evaluation of the following areas was limited: Direct tissue palpation, direct goniometric measurements, blood pressure, O2 saturation. Pursuant to the emergency declaration under the [address] [place] [address], [address] [place] [address] and the [employer] General Act, this Virtual Visit was conducted with patient's (and/or legal guardian's) consent, to reduce the risk of exposure to COVID-19 and provide necessary medical care. Services were provided through a video synchronous discussion virtually to substitute for in-person encounter. --Ariana Davis PT on 4/22/2020 at 7:00 AM    An electronic signature was used to authenticate this note.

## 2020-04-29 ENCOUNTER — HOSPITAL ENCOUNTER (OUTPATIENT)
Dept: PHYSICAL THERAPY | Age: 40
Discharge: HOME | End: 2020-04-29
Payer: SELF-PAY

## 2020-04-29 PROCEDURE — 97112 NEUROMUSCULAR REEDUCATION: CPT | Performed by: PHYSICAL THERAPIST

## 2020-04-29 PROCEDURE — 97110 THERAPEUTIC EXERCISES: CPT | Performed by: PHYSICAL THERAPIST

## 2020-04-29 NOTE — PROGRESS NOTES
PT DAILY TREATMENT NOTE 2-15    Patient Name: Higinio Escobar  Date:2020  : 1980  [x]  Patient  Verified  Payor: SELF PAY / Plan: [facility] SELF PAY / Product Type: Self Pay /     In time:7:10 AM  Out time:7:55 AM  Total Treatment Time (min): 45  Visit #:  8    Treatment Area: Dizziness and giddiness [R42]    Higinio Escobar was informed of the inherent limitations of a virtual visit,  and has consented to a virtual therapy visit on 2020. Information regarding emergency contact information for this patient during this visit is to contact:  Romulo Cuevas at [phone] in addition to calling 911. The patient was informed that at any time during the virtual visit, they can decide to stop the virtual visit. The patient verified that they are physically located in the [facility] for this virtual visit.     SUBJECTIVE  Pain Level (0-10 scale): 4/10  Any medication changes, allergies to medications, adverse drug reactions, diagnosis change, or new procedure performed?: [x] No    [] Yes (see summary sheet for update)  Subjective functional status/changes:   [] No changes reported  Pt reports her balance is getting better \"Sometimes my headaches are really bad\" \"Saturday I had a sharp pain and it was across her R forehead\" \"It lasted over an hour\" \"I had to take medication\"      OBJECTIVE    15 min Therapeutic Exercise:  [x] See flow sheet :   Rationale: increase ROM, increase strength and improve coordination to improve the patients ability to sit, stand, transfer, ambulate, lift, carry, reach, complete ADLs    30 min Neuromuscular Re-education:  [x]  See flow sheet :   Rationale: improve coordination, improve balance and increase proprioception  to improve the patients ability to sit, stand, transfer, ambulate, lift, carry, reach, complete ADLs       With   [x] TE   [] TA   [x] neuro   [] other: Patient Education: [x] Review HEP    [] Progressed/Changed HEP based on:   [x] positioning   [x] body mechanics   [] transfers   [x] heat/ice application    [] other:      Other Objective/Functional Measures:   SLS on pillow EO 30 s  B  HTT EC 30 s B    Excellent tolerance of VOR x2 this visit in standing, blank wall, 45 s    Pain Level (0-10 scale) post treatment: 0    ASSESSMENT/Changes in Function:     Patient will continue to benefit from skilled PT services to modify and progress therapeutic interventions, address functional mobility deficits, address ROM deficits, address strength deficits, analyze and address soft tissue restrictions, analyze and cue movement patterns, analyze and modify body mechanics/ergonomics, assess and modify postural abnormalities, address imbalance/dizziness and instruct in home and community integration to attain remaining goals. []  See Plan of Care  []  See progress note/recertification  []  See Discharge Summary         Progress towards goals / Updated goals:   Advanced Balance and vestibular HEP    PLAN  [x]  Upgrade activities as tolerated     [x]  Continue plan of care  [x]  Update interventions per flow sheet       []  Discharge due to:_  []  Other:_        Dyan Martell is a 38 y.o. female being evaluated by a Virtual Visit (video visit) encounter to address concerns as mentioned above. A caregiver was present when appropriate. Due to this being a TeleHealth encounter (During [facility] public health emergency), evaluation of the following areas was limited: Direct tissue palpation, direct goniometric measurements, blood pressure, O2 saturation. Pursuant to the emergency declaration under the [facility], [address] [place] [address] authority and the [employer] General Act, this Virtual Visit was conducted with patient's (and/or legal guardian's) consent, to reduce the risk of exposure to COVID-19 and provide necessary medical care.       Services were provided through a video synchronous discussion virtually to substitute for in-person encounter. --Ariana Davis, PT on 4/29/2020 at 7:00 AM    An electronic signature was used to authenticate this note.

## 2020-05-06 ENCOUNTER — HOSPITAL ENCOUNTER (OUTPATIENT)
Dept: PHYSICAL THERAPY | Age: 40
Discharge: HOME | End: 2020-05-06
Payer: COMMERCIAL

## 2020-05-06 PROCEDURE — 97110 THERAPEUTIC EXERCISES: CPT | Performed by: PHYSICAL THERAPIST

## 2020-05-06 PROCEDURE — 97112 NEUROMUSCULAR REEDUCATION: CPT | Performed by: PHYSICAL THERAPIST

## 2020-05-06 NOTE — PROGRESS NOTES
PT DAILY TREATMENT NOTE 2-15    Patient Name: Amalia Lim  Date:2020  : 1980  [x]  Patient  Verified  Payor: SELF PAY / Plan: [employer] SELF PAY / Product Type: Self Pay /     In time:7:05 AM  Out time:8:00 AM  Total Treatment Time (min): 55  Visit #:  9    Treatment Area: Dizziness and giddiness [R42]    Amalia Lim was informed of the inherent limitations of a virtual visit,  and has consented to a virtual therapy visit on 2020. Information regarding emergency contact information for this patient during this visit is to contact:  Linwood Peace at [phone] in addition to calling 911. The patient was informed that at any time during the virtual visit, they can decide to stop the virtual visit. The patient verified that they are physically located in the [facility] for this virtual visit.     SUBJECTIVE  Pain Level (0-10 scale): 4/10  Any medication changes, allergies to medications, adverse drug reactions, diagnosis change, or new procedure performed?: [x] No    [] Yes (see summary sheet for update)  Subjective functional status/changes:   [] No changes reported  Pt reports she has been able to walk 30 minutes every day  Pt continues to have a sensation that she is spinning intermittently  Pt has been having some right sided neck and arm pain    OBJECTIVE    25 min Therapeutic Exercise:  [x] See flow sheet :   Rationale: increase ROM, increase strength and improve coordination to improve the patients ability to sit, stand, transfer, ambulate, lift, carry, reach, complete ADLs    30 min Neuromuscular Re-education:  [x]  See flow sheet :   Rationale: improve coordination, improve balance and increase proprioception  to improve the patients ability to sit, stand, transfer, ambulate, lift, carry, reach, complete ADLs       With   [x] TE   [] TA   [x] neuro   [] other: Patient Education: [x] Review HEP    [] Progressed/Changed HEP based on:   [x] positioning   [x] body mechanics   [] transfers   [x] heat/ice application    [] other:      Other Objective/Functional Measures:   SLS on pillow EO 30 s  B  HTT EC 30 s B     Cervical spine rotation R 75% L 100%    Excellent tolerance of VOR x2 this visit in standing, blank wall, 60 s    Pain Level (0-10 scale) post treatment: 0    ASSESSMENT/Changes in Function:     Patient will continue to benefit from skilled PT services to modify and progress therapeutic interventions, address functional mobility deficits, address ROM deficits, address strength deficits, analyze and address soft tissue restrictions, analyze and cue movement patterns, analyze and modify body mechanics/ergonomics, assess and modify postural abnormalities, address imbalance/dizziness and instruct in home and community integration to attain remaining goals. []  See Plan of Care  [x]  See progress note/recertification  []  See Discharge Summary         Progress towards goals / Updated goals:   See prog note    PLAN  [x]  Upgrade activities as tolerated     [x]  Continue plan of care  [x]  Update interventions per flow sheet       []  Discharge due to:_  []  Other:_        Dyan Martell is a 38 y.o. female being evaluated by a Virtual Visit (video visit) encounter to address concerns as mentioned above. A caregiver was present when appropriate. Due to this being a TeleHealth encounter (During [facility] public health emergency), evaluation of the following areas was limited: Direct tissue palpation, direct goniometric measurements, blood pressure, O2 saturation. Pursuant to the emergency declaration under the [address] [place] [address], [address] [place] [address] authority and the [employer] and [employer] General Act, this Virtual Visit was conducted with patient's (and/or legal guardian's) consent, to reduce the risk of exposure to COVID-19 and provide necessary medical care.       Services were provided through a video synchronous discussion virtually to substitute for in-person encounter. --Olimpia Vargas, PT on 5/6/2020 at 7:00 AM    An electronic signature was used to authenticate this note.

## 2020-05-06 NOTE — PROGRESS NOTES
[facility]  [place]  [facility] Vanessa Jacob  Phone: [phone]      Fax:  [phone]    Progress Note and Updated POC    Name: Arash Hensley   : 1980   MD: Franklin Stratton MD       Treatment Diagnosis: Dizziness and giddiness [R42]  Start of Care: 2/10/20    Visits from Start of Care: 9  Missed Visits: 0    Summary of Care: Therapeutic Exercise,  Manual Therapy, Neuromuscular Re-education,Patient Education, Home Exercise Program         Assessment / Recommendations: The patient has completed 9 visits and has made significant gains in static balance and demonstrates improved activity tolerance. The patient reports improved driving tolerance and performance of work tasks. The patient has initiated a walking program 30 minutes per day without an increase in symptoms. Overall, her symptoms are less frequent and less intense. The patient continues to be limited by intermittent neck pain, headaches, dizziness and nausea, affecting her ability to perform prolonged tasks. She reports experiencing these symptoms 2-3 times per week. She continues to require medication to sleep through the night and reports continued difficulty with her memory. The patient has met all short term goals and would benefit from continued PT 1x every other week to reach updated long term goals below. Short Term Goals: To be accomplished in 4 weeks:  1) The patient will be independent with introductory HEP - MET  2) The patient will improve cervical rotation to 50 deg B to improve ease with driving - Not Measured secondary to telehealth limitations  3) The patient will demonstrate negative Romberg to indicate improved static stance - MET  Long Term Goals:  To be accomplished in 12 weeks:  1) The patient will report ability to complete household chores without an increase in symptoms - MET  2) The patient will report ability to complete work tasks without an increase in symptoms- MET intermittently  3) The patient will report ability to drive for one hour without an increase in symptoms -  NOT MET secondary to hasn't attempted. Pt drives 45 min to work without a problem. Updated Goals: To be accomplished in 15 visits:  1) The patient will be independent with pain management HEP  2) The patient will sleep through the night without sleep aid  3) The patient will report no pain with cervical rotation WFL to improve ease with driving    Fabiana Cherry, PT 5/6/2020    ________________________________________________________________________  NOTE TO PHYSICIAN:  Please complete the following and fax to: [facility]: [phone]  . Retain this original for your records. If you are unable to process this request in 24 hours, please contact our office.        ____ I have read the above report and request that my patient continue therapy with the following changes/special instructions:  ____ I have read the above report and request that my patient be discharged from therapy    Physician's Signature:_________________ Date:___________Time:__________

## 2020-05-20 ENCOUNTER — HOSPITAL ENCOUNTER (OUTPATIENT)
Dept: PHYSICAL THERAPY | Age: 40
Discharge: HOME | End: 2020-05-20
Payer: COMMERCIAL

## 2020-05-20 PROCEDURE — 97110 THERAPEUTIC EXERCISES: CPT | Performed by: PHYSICAL THERAPIST

## 2020-05-20 PROCEDURE — 97112 NEUROMUSCULAR REEDUCATION: CPT | Performed by: PHYSICAL THERAPIST
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
## 2020-06-03 ENCOUNTER — HOSPITAL ENCOUNTER (OUTPATIENT)
Dept: PHYSICAL THERAPY | Age: 40
Discharge: HOME | End: 2020-06-03
Payer: COMMERCIAL

## 2020-06-03 PROCEDURE — 97110 THERAPEUTIC EXERCISES: CPT | Performed by: PHYSICAL THERAPIST

## 2020-06-03 PROCEDURE — 97112 NEUROMUSCULAR REEDUCATION: CPT | Performed by: PHYSICAL THERAPIST
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
## 2020-06-17 ENCOUNTER — HOSPITAL ENCOUNTER (OUTPATIENT)
Dept: PHYSICAL THERAPY | Age: 40
Discharge: HOME | End: 2020-06-17
Payer: COMMERCIAL

## 2020-06-17 ENCOUNTER — TELEPHONE (OUTPATIENT)
Dept: NEUROLOGY | Age: 40
End: 2020-06-17

## 2020-06-17 PROCEDURE — 97112 NEUROMUSCULAR REEDUCATION: CPT | Performed by: PHYSICAL THERAPIST
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
## 2020-06-17 NOTE — PROGRESS NOTES
PT DAILY TREATMENT NOTE 2-15    Patient Name: Verito Delgado  Date:2020  : 1980  [x]  Patient  Verified  Payor: SELF PAY / Plan: [facility] SELF PAY / Product Type: Self Pay /     In time:7:05 AM  Out time:8:00 AM  Total Treatment Time (min): 55  Visit #:  12    Treatment Area: Dizziness and giddiness [R42]    Verito Delgado was informed of the inherent limitations of a virtual visit,  and has consented to a virtual therapy visit on 2020. Information regarding emergency contact information for this patient during this visit is to contact:  Maritza Kim at [phone] in addition to calling 911. The patient was informed that at any time during the virtual visit, they can decide to stop the virtual visit. The patient verified that they are physically located in the [facility] for this virtual visit. SUBJECTIVE  Pain Level (0-10 scale): 4/10  Any medication changes, allergies to medications, adverse drug reactions, diagnosis change, or new procedure performed?: [x] No    [] Yes (see summary sheet for update)  Subjective functional status/changes:   [] No changes reported  Pt reports she has been feeling much better overall and was able to do a lot of walking over the past 2 weeks but she still knows she has trouble focusing and with her memory.   \"My short term memory is so much worse than it was before, it makes it hard at work\"    OBJECTIVE    55 min Neuromuscular Re-education:  [x]  See flow sheet :   Rationale: improve coordination, improve balance and increase proprioception  to improve the patients ability to sit, stand, transfer, ambulate, lift, carry, reach, complete ADLs       With   [x] TE   [] TA   [x] neuro   [] other: Patient Education: [x] Review HEP    [] Progressed/Changed HEP based on:   [x] positioning   [x] body mechanics   [] transfers   [x] heat/ice application    [] other:      Other Objective/Functional Measures:   Tandem ec 30 seconds   Slight increase in dizziness with VOR x 2 in NBOS facing pattern, recovered in < 1 minute    Pt with fatigue after todays interventions    Pain Level (0-10 scale) post treatment: \"better\"    ASSESSMENT/Changes in Function:        []  See Plan of Care  []  See progress note/recertification  [x]  See Discharge Summary         Progress towards goals / Updated goals: Will prepare for d/c next visit    PLAN  []  Upgrade activities as tolerated     []  Continue plan of care  []  Update interventions per flow sheet       [x]  Discharge due to:_ pt progress has plateaued  []  Other:_        Yoel Solitario is a 38 y.o. female being evaluated by a Virtual Visit (video visit) encounter to address concerns as mentioned above. A caregiver was present when appropriate. Due to this being a TeleHealth encounter (During [place] [address] public health emergency), evaluation of the following areas was limited: Direct tissue palpation, direct goniometric measurements, blood pressure, O2 saturation. Pursuant to the emergency declaration under the [address] [place] [address] authority and the [employer] General Act, this Virtual Visit was conducted with patient's (and/or legal guardian's) consent, to reduce the risk of exposure to COVID-19 and provide necessary medical care. Services were provided through a video synchronous discussion virtually to substitute for in-person encounter. --Fabiana Cherry, PT on 6/17/2020 at 7:00 AM    An electronic signature was used to authenticate this note.

## 2020-06-25 ENCOUNTER — TELEPHONE (OUTPATIENT)
Dept: NEUROLOGY | Age: 40
End: 2020-06-25

## 2020-06-25 NOTE — TELEPHONE ENCOUNTER
----- Message from Lauren España sent at 6/25/2020 12:06 PM EDT -----  Regarding: /Telephone  General Message/Vendor Calls    Caller's first and last name: Marychuy Reeceallen      Reason for call:returning a miss call from [facility] [place] required yes/no and why:yes      Best contact number(s):[phone]      Details to clarify the request: Marychuy Díaz called from [facility] called returning a call from a miss call  from [facility] Elmira España

## 2022-04-05 ENCOUNTER — APPOINTMENT (OUTPATIENT)
Dept: CT IMAGING | Age: 42
End: 2022-04-05
Attending: NURSE PRACTITIONER
Payer: COMMERCIAL

## 2022-04-05 ENCOUNTER — HOSPITAL ENCOUNTER (EMERGENCY)
Age: 42
Discharge: HOME | End: 2022-04-05
Payer: COMMERCIAL

## 2022-04-05 VITALS
SYSTOLIC BLOOD PRESSURE: 116 MMHG | HEART RATE: 79 BPM | RESPIRATION RATE: 18 BRPM | OXYGEN SATURATION: 99 % | HEIGHT: 62 IN | TEMPERATURE: 98.3 F | DIASTOLIC BLOOD PRESSURE: 75 MMHG | BODY MASS INDEX: 25.76 KG/M2 | WEIGHT: 140 LBS

## 2022-04-05 DIAGNOSIS — N20.0 KIDNEY STONE ON RIGHT SIDE: Primary | ICD-10-CM

## 2022-04-05 LAB
ALBUMIN SERPL-MCNC: 3.9 G/DL (ref 3.5–5)
ALBUMIN/GLOB SERPL: 1.1 {RATIO} (ref 1.1–2.2)
ALP SERPL-CCNC: 70 U/L (ref 45–117)
ALT SERPL-CCNC: 20 U/L (ref 12–78)
ANION GAP SERPL CALC-SCNC: 4 MMOL/L (ref 5–15)
APPEARANCE UR: CLEAR
AST SERPL W P-5'-P-CCNC: 19 U/L (ref 15–37)
BACTERIA URNS QL MICRO: NEGATIVE /HPF
BASOPHILS # BLD: 0 K/UL (ref 0–0.1)
BASOPHILS NFR BLD: 0 % (ref 0–1)
BILIRUB SERPL-MCNC: 0.4 MG/DL (ref 0.2–1)
BILIRUB UR QL: NEGATIVE
BUN SERPL-MCNC: 6 MG/DL (ref 6–20)
BUN/CREAT SERPL: 11 (ref 12–20)
CA-I BLD-MCNC: 9.1 MG/DL (ref 8.5–10.1)
CHLORIDE SERPL-SCNC: 105 MMOL/L (ref 97–108)
CO2 SERPL-SCNC: 28 MMOL/L (ref 21–32)
COLOR UR: ABNORMAL
CREAT SERPL-MCNC: 0.57 MG/DL (ref 0.55–1.02)
DIFFERENTIAL METHOD BLD: NORMAL
EOSINOPHIL # BLD: 0.1 K/UL (ref 0–0.4)
EOSINOPHIL NFR BLD: 1 % (ref 0–7)
ERYTHROCYTE [DISTWIDTH] IN BLOOD BY AUTOMATED COUNT: 14.4 % (ref 11.5–14.5)
GLOBULIN SER CALC-MCNC: 3.7 G/DL (ref 2–4)
GLUCOSE SERPL-MCNC: 87 MG/DL (ref 65–100)
GLUCOSE UR STRIP.AUTO-MCNC: NEGATIVE MG/DL
HCT VFR BLD AUTO: 39.2 % (ref 35–47)
HGB BLD-MCNC: 12.6 G/DL (ref 11.5–16)
HGB UR QL STRIP: ABNORMAL
IMM GRANULOCYTES # BLD AUTO: 0 K/UL (ref 0–0.04)
IMM GRANULOCYTES NFR BLD AUTO: 0 % (ref 0–0.5)
KETONES UR QL STRIP.AUTO: NEGATIVE MG/DL
LEUKOCYTE ESTERASE UR QL STRIP.AUTO: NEGATIVE
LYMPHOCYTES # BLD: 2.7 K/UL (ref 0.8–3.5)
LYMPHOCYTES NFR BLD: 32 % (ref 12–49)
MCH RBC QN AUTO: 28.6 PG (ref 26–34)
MCHC RBC AUTO-ENTMCNC: 32.1 G/DL (ref 30–36.5)
MCV RBC AUTO: 89.1 FL (ref 80–99)
MONOCYTES # BLD: 0.6 K/UL (ref 0–1)
MONOCYTES NFR BLD: 7 % (ref 5–13)
NEUTS SEG # BLD: 5.1 K/UL (ref 1.8–8)
NEUTS SEG NFR BLD: 60 % (ref 32–75)
NITRITE UR QL STRIP.AUTO: NEGATIVE
NRBC # BLD: 0 K/UL (ref 0–0.01)
NRBC BLD-RTO: 0 PER 100 WBC
PH UR STRIP: 7 [PH] (ref 5–8)
PLATELET # BLD AUTO: 308 K/UL (ref 150–400)
PMV BLD AUTO: 9.2 FL (ref 8.9–12.9)
POTASSIUM SERPL-SCNC: 4.4 MMOL/L (ref 3.5–5.1)
PROT SERPL-MCNC: 7.6 G/DL (ref 6.4–8.2)
PROT UR STRIP-MCNC: NEGATIVE MG/DL
RBC # BLD AUTO: 4.4 M/UL (ref 3.8–5.2)
RBC #/AREA URNS HPF: ABNORMAL /HPF (ref 0–5)
SODIUM SERPL-SCNC: 137 MMOL/L (ref 136–145)
SP GR UR REFRACTOMETRY: <1.005 (ref 1–1.03)
UA: UC IF INDICATED,UAUC: ABNORMAL
UROBILINOGEN UR QL STRIP.AUTO: 0.1 EU/DL (ref 0.1–1)
WBC # BLD AUTO: 8.5 K/UL (ref 3.6–11)
WBC URNS QL MICRO: ABNORMAL /HPF (ref 0–4)

## 2022-04-05 PROCEDURE — 96374 THER/PROPH/DIAG INJ IV PUSH: CPT

## 2022-04-05 PROCEDURE — 85025 COMPLETE CBC W/AUTO DIFF WBC: CPT

## 2022-04-05 PROCEDURE — 81001 URINALYSIS AUTO W/SCOPE: CPT

## 2022-04-05 PROCEDURE — 99284 EMERGENCY DEPT VISIT MOD MDM: CPT

## 2022-04-05 PROCEDURE — 75810000275 HC EMERGENCY DEPT VISIT NO LEVEL OF CARE

## 2022-04-05 PROCEDURE — 96375 TX/PRO/DX INJ NEW DRUG ADDON: CPT

## 2022-04-05 PROCEDURE — 80053 COMPREHEN METABOLIC PANEL: CPT

## 2022-04-05 PROCEDURE — 74011250636 HC RX REV CODE- 250/636: Performed by: NURSE PRACTITIONER

## 2022-04-05 PROCEDURE — 74176 CT ABD & PELVIS W/O CONTRAST: CPT

## 2022-04-05 RX ORDER — TAMSULOSIN HYDROCHLORIDE 0.4 MG/1
0.4 CAPSULE ORAL DAILY
Qty: 15 CAPSULE | Refills: 0 | Status: SHIPPED | OUTPATIENT
Start: 2022-04-05 | End: 2022-04-20

## 2022-04-05 RX ORDER — HYDROCODONE BITARTRATE AND ACETAMINOPHEN 5; 325 MG/1; MG/1
1 TABLET ORAL
Qty: 12 TABLET | Refills: 0 | Status: SHIPPED | OUTPATIENT
Start: 2022-04-05 | End: 2022-04-08

## 2022-04-05 RX ORDER — KETOROLAC TROMETHAMINE 30 MG/ML
30 INJECTION, SOLUTION INTRAMUSCULAR; INTRAVENOUS
Status: COMPLETED | OUTPATIENT
Start: 2022-04-05 | End: 2022-04-05

## 2022-04-05 RX ORDER — MORPHINE SULFATE 4 MG/ML
4 INJECTION INTRAVENOUS ONCE
Status: COMPLETED | OUTPATIENT
Start: 2022-04-05 | End: 2022-04-05

## 2022-04-05 RX ORDER — IBUPROFEN 400 MG/1
400 TABLET ORAL
Qty: 20 TABLET | Refills: 0 | Status: SHIPPED | OUTPATIENT
Start: 2022-04-05

## 2022-04-05 RX ORDER — ONDANSETRON 4 MG/1
4 TABLET, ORALLY DISINTEGRATING ORAL
Qty: 10 TABLET | Refills: 0 | Status: SHIPPED | OUTPATIENT
Start: 2022-04-05

## 2022-04-05 RX ORDER — ONDANSETRON 2 MG/ML
4 INJECTION INTRAMUSCULAR; INTRAVENOUS
Status: COMPLETED | OUTPATIENT
Start: 2022-04-05 | End: 2022-04-05

## 2022-04-05 RX ADMIN — ONDANSETRON 4 MG: 2 INJECTION INTRAMUSCULAR; INTRAVENOUS at 13:22

## 2022-04-05 RX ADMIN — MORPHINE SULFATE 4 MG: 4 INJECTION INTRAVENOUS at 13:22

## 2022-04-05 RX ADMIN — KETOROLAC TROMETHAMINE 30 MG: 30 INJECTION, SOLUTION INTRAMUSCULAR; INTRAVENOUS at 13:22

## 2022-04-05 RX ADMIN — SODIUM CHLORIDE 1000 ML: 9 INJECTION, SOLUTION INTRAVENOUS at 13:22

## 2022-04-05 NOTE — ED PROVIDER NOTES
EMERGENCY DEPARTMENT HISTORY AND PHYSICAL EXAM      Date: 4/5/2022  Patient Name: Prema Alcaraz    History of Presenting Illness     Chief Complaint   Patient presents with    Back Pain       History Provided By: Patient    HPI: Prema Alcaraz, 36 y.o. female with a past medical history significant No significant past medical history presents to the ED with cc of back pain. Patient states she is having right lower back spasms. Patient denies any saddle anesthesia. Patient denies any urinary symptoms or incontinent episodes. There are no other complaints, changes, or physical findings at this time. PCP: Kyleigh Moreno MD    No current facility-administered medications on file prior to encounter. Current Outpatient Medications on File Prior to Encounter   Medication Sig Dispense Refill    diclofenac (VOLTAREN XR) 100 mg ER tablet Take 1 Tab by mouth daily. 30 Tab 3    nortriptyline (PAMELOR) 10 mg capsule Take 1 Cap by mouth nightly. 30 Cap 2    ibuprofen (MOTRIN) 800 mg tablet Take 1 Tab by mouth every eight (8) hours as needed for Pain. 30 Tab 1    cyclobenzaprine (FLEXERIL) 10 mg tablet TAKE 1/2 TABLET BY MOUTH TWICE A DAY AS NEEDED  3    naproxen (NAPROSYN) 500 mg tablet TAKE 1 TABLET BY MOUTH TWICE A DAY WITH FOOD  3    predniSONE (DELTASONE) 10 mg tablet Take 10 mg by mouth two (2) times a day.  30 Tab 0       Past History     Past Medical History:  Past Medical History:   Diagnosis Date    Anxiety disorder     Chest pain     Depression     Fatigue     Frequent headaches     Memory disorder     Muscle pain     Muscle weakness     Nausea and vomiting     Ringing in the ears     SOB (shortness of breath)     Visual disturbance        Past Surgical History:  Past Surgical History:   Procedure Laterality Date    HX TUBAL LIGATION         Family History:  Family History   Problem Relation Age of Onset    No Known Problems Mother     No Known Problems Father    Aggie Willingham Cancer Maternal Grandfather         lung       Social History:  Social History     Tobacco Use    Smoking status: Former Smoker    Smokeless tobacco: Never Used    Tobacco comment: as a teen   Substance Use Topics    Alcohol use: Never    Drug use: Not on file       Allergies: Allergies   Allergen Reactions    Beeswax Hives     swelling    Wasp [Venom-Wasp] Swelling         Review of Systems     Review of Systems   Constitutional: Negative for chills, fatigue and fever. HENT: Negative for congestion, sinus pressure and trouble swallowing. Eyes: Negative for photophobia and pain. Respiratory: Negative for cough and shortness of breath. Cardiovascular: Negative for chest pain and leg swelling. Gastrointestinal: Negative for abdominal pain, diarrhea, nausea and vomiting. Endocrine: Negative for polydipsia, polyphagia and polyuria. Genitourinary: Negative for decreased urine volume, difficulty urinating, dysuria, hematuria and urgency. Musculoskeletal: Positive for back pain. Negative for gait problem, myalgias and neck pain. Skin: Negative for pallor and rash. Allergic/Immunologic: Negative for environmental allergies and food allergies. Neurological: Negative for dizziness, facial asymmetry, speech difficulty, numbness and headaches. Hematological: Negative for adenopathy. Does not bruise/bleed easily. Psychiatric/Behavioral: Negative for agitation, self-injury and suicidal ideas. The patient is not nervous/anxious. Physical Exam     Physical Exam  Vitals and nursing note reviewed. Constitutional:       Appearance: Normal appearance. HENT:      Head: Atraumatic. Right Ear: Tympanic membrane and external ear normal.      Left Ear: Tympanic membrane and external ear normal.      Nose: Nose normal.      Mouth/Throat:      Mouth: Mucous membranes are dry. Eyes:      Extraocular Movements: Extraocular movements intact.       Pupils: Pupils are equal, round, and reactive to light. Cardiovascular:      Rate and Rhythm: Normal rate and regular rhythm. Pulses: Normal pulses. Heart sounds: Normal heart sounds. Pulmonary:      Breath sounds: Normal breath sounds. Abdominal:      General: Abdomen is flat. Palpations: Abdomen is soft. Musculoskeletal:         General: Normal range of motion. Cervical back: Normal range of motion and neck supple. Lumbar back: Spasms present. Skin:     General: Skin is warm and dry. Capillary Refill: Capillary refill takes less than 2 seconds. Neurological:      General: No focal deficit present. Mental Status: She is alert and oriented to person, place, and time. Mental status is at baseline. Psychiatric:         Mood and Affect: Mood normal.         Behavior: Behavior normal.         Lab and Diagnostic Study Results     Labs -     No results found for this or any previous visit (from the past 12 hour(s)). Radiologic Studies -   @lastxrresult@  CT Results  (Last 48 hours)    None        CXR Results  (Last 48 hours)    None            Medical Decision Making   - I am the first provider for this patient. - I reviewed the vital signs, available nursing notes, past medical history, past surgical history, family history and social history. - Initial assessment performed. The patients presenting problems have been discussed, and they are in agreement with the care plan formulated and outlined with them. I have encouraged them to ask questions as they arise throughout their visit. Vital Signs-Reviewed the patient's vital signs. No data found. Records Reviewed: Nursing Notes and Old Medical Records          ED Course:     ED Course as of 04/12/22 0556   Tue Apr 05, 2022   1238 Blood(!): Small [CB]      ED Course User Index  [CB] Izabel Lissa, NP       Provider Notes (Medical Decision Making): The patient presents with acute low back pain.  Stable vitals and benign exam. DDx: strain, sprain, sciatica, MSK pain. Clinical presentation not consistent with  pathology, aortic dissection or AAA. There is no urine/bowel incontinence or perianal numbess to suggest cauda equina. No fever/chills, IVDA to suggest epidural abscess or discitis. No focal weakness or sensory changes to suggest transverse myelitis. Therefore MRI not indicated. No risk of compression fracture (not in right age group or suffer from [address]) or trauma to warrant x-ray. I have recommended rest, avoiding heavy lifting until better, use of intermittent heat (avoid sleeping on a heating pad). Call PCP if back pain persists or she develops leg symptoms or other concerning red flags. Will provide pain control  MDM       Procedures   Medical Decision Makingedical Decision Making  Performed by: Rodríguez Kirk NP  PROCEDURES:  Procedures       Disposition   Disposition: DC- Adult Discharges: All of the diagnostic tests were reviewed and questions answered. Diagnosis, care plan and treatment options were discussed. The patient understands the instructions and will follow up as directed. The patients results have been reviewed with them. They have been counseled regarding their diagnosis. The patient verbally convey understanding and agreement of the signs, symptoms, diagnosis, treatment and prognosis and additionally agrees to follow up as recommended with their PCP in 24 - 48 hours. They also agree with the care-plan and convey that all of their questions have been answered. I have also put together some discharge instructions for them that include: 1) educational information regarding their diagnosis, 2) how to care for their diagnosis at home, as well a 3) list of reasons why they would want to return to the ED prior to their follow-up appointment, should their condition change. Discharged    DISCHARGE PLAN:  1.    Current Discharge Medication List      CONTINUE these medications which have NOT CHANGED    Details   diclofenac (VOLTAREN XR) 100 mg ER tablet Take 1 Tab by mouth daily. Qty: 30 Tab, Refills: 3      nortriptyline (PAMELOR) 10 mg capsule Take 1 Cap by mouth nightly. Qty: 30 Cap, Refills: 2      ibuprofen (MOTRIN) 800 mg tablet Take 1 Tab by mouth every eight (8) hours as needed for Pain. Qty: 30 Tab, Refills: 1      cyclobenzaprine (FLEXERIL) 10 mg tablet TAKE 1/2 TABLET BY MOUTH TWICE A DAY AS NEEDED  Refills: 3      naproxen (NAPROSYN) 500 mg tablet TAKE 1 TABLET BY MOUTH TWICE A DAY WITH FOOD  Refills: 3      predniSONE (DELTASONE) 10 mg tablet Take 10 mg by mouth two (2) times a day. Qty: 30 Tab, Refills: 0           2. Follow-up Information     Follow up With Specialties Details Why Contact Info    Homa Hussein MD Urology Schedule an appointment as soon as possible for a visit  As needed [address] [place] [address]  [address] [place] [address] Urology    Elmira Romero [address] [place] [address]        3. Return to ED if worse   4. Discharge Medication List as of 4/5/2022  3:42 PM      START taking these medications    Details   tamsulosin (Flomax) 0.4 mg capsule Take 1 Capsule by mouth daily for 15 days. , Normal, Disp-15 Capsule, R-0      HYDROcodone-acetaminophen (Lorcet, HYDROcodone,) 5-325 mg per tablet Take 1 Tablet by mouth every six (6) hours as needed for Pain for up to 3 days. Max Daily Amount: 4 Tablets., Normal, Disp-12 Tablet, R-0      !! ibuprofen (MOTRIN) 400 mg tablet Take 1 Tablet by mouth every six (6) hours as needed for Pain., Normal, Disp-20 Tablet, R-0      ondansetron (ZOFRAN ODT) 4 mg disintegrating tablet Take 1 Tablet by mouth every eight (8) hours as needed for Nausea or Vomiting., Normal, Disp-10 Tablet, R-0       !! - Potential duplicate medications found. Please discuss with provider. CONTINUE these medications which have NOT CHANGED    Details   diclofenac (VOLTAREN XR) 100 mg ER tablet Take 1 Tab by mouth daily. , Normal, Disp-30 Tab, R-3 nortriptyline (PAMELOR) 10 mg capsule Take 1 Cap by mouth nightly., Normal, Disp-30 Cap, R-2      !! ibuprofen (MOTRIN) 800 mg tablet Take 1 Tab by mouth every eight (8) hours as needed for Pain., Normal, Disp-30 Tab, R-1      cyclobenzaprine (FLEXERIL) 10 mg tablet TAKE 1/2 TABLET BY MOUTH TWICE A DAY AS NEEDED, Historical Med, R-3      naproxen (NAPROSYN) 500 mg tablet TAKE 1 TABLET BY MOUTH TWICE A DAY WITH FOOD, Historical Med, R-3      predniSONE (DELTASONE) 10 mg tablet Take 10 mg by mouth two (2) times a day., Normal, Disp-30 Tab, R-0       !! - Potential duplicate medications found. Please discuss with provider. Diagnosis     Clinical Impression:   1. Kidney stone on right side        Attestations:    Sara Khan NP    Please note that this dictation was completed with [employer], the computer voice recognition software. Quite often unanticipated grammatical, syntax, homophones, and other interpretive errors are inadvertently transcribed by the computer software. Please disregard these errors. Please excuse any errors that have escaped final proofreading. Thank you.

## 2023-05-25 RX ORDER — ONDANSETRON 4 MG/1
4 TABLET, ORALLY DISINTEGRATING ORAL EVERY 8 HOURS PRN
COMMUNITY
Start: 2022-04-05

## 2023-05-25 RX ORDER — PREDNISONE 10 MG/1
10 TABLET ORAL 2 TIMES DAILY
COMMUNITY
Start: 2019-10-23

## 2023-05-25 RX ORDER — NAPROXEN 500 MG/1
1 TABLET ORAL 2 TIMES DAILY WITH MEALS
COMMUNITY
Start: 2019-10-01

## 2023-05-25 RX ORDER — IBUPROFEN 400 MG/1
400 TABLET ORAL EVERY 6 HOURS PRN
COMMUNITY
Start: 2022-04-05

## 2023-05-25 RX ORDER — CYCLOBENZAPRINE HCL 10 MG
TABLET ORAL
COMMUNITY
Start: 2019-10-01

## 2023-05-25 RX ORDER — IBUPROFEN 800 MG/1
800 TABLET ORAL EVERY 8 HOURS PRN
COMMUNITY
Start: 2019-12-27

## 2023-05-25 RX ORDER — NORTRIPTYLINE HYDROCHLORIDE 10 MG/1
10 CAPSULE ORAL
COMMUNITY
Start: 2020-02-25

## 2023-06-09 ENCOUNTER — TELEPHONE (OUTPATIENT)
Age: 43
End: 2023-06-09